# Patient Record
(demographics unavailable — no encounter records)

---
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
## 2024-10-11 NOTE — RESULTS/DATA
[TextEntry] : Chest CT imaging as above.  PSG from 1/31/18 revealed moderate ALTHEA with an AHI of 22 The patient's overall compliance was 93% with a >4hr compliance of 70% on autoCPAP with a median and max pressure of 5.7 and 10.9 cm of water, respectively with a residual AHI of 1 which was normal. The patient's current overall compliance is 87% with a >4hr compliance of 27% on autoCPAP with a median and max pressure of 5.3 and 8.8 cm of water, respectively with a residual AHI of 2.5 which is normal. Pt no longer compliant.

## 2024-10-11 NOTE — DISCUSSION/SUMMARY
[FreeTextEntry1] : #1. Previously, there was mild restriction on spirometry though lung volumes were essentially normal; PFT findings were likely weight related though rib fx could be contributing as well. Repeat lung function testing with weight loss. #2. Diet and exercise for weight loss #3. The patient does not appear to require BD therapy at this time #4. SOBOE is likely related to weight or deconditioning given PFT findings #5. Continue autoCPAP to treat moderate ALTHEA; changed to 5-8 cm of water per patient's request; encouraged improved > 4 hr compliance of at least 70% #6. Repeat chest CT in 7/2020 was clear. Repeat imaging as needed. #7. F/u in 4-6 weeks with compliance. #8. Replace equipment as needed; ordered 10/11/24.  The patient expressed understanding and agreement with the above recommendations/plan and accepts responsibility to be compliant with recommended testing, therapies, and f/u visits. All relevant questions and concerns were addressed.

## 2024-10-11 NOTE — PROCEDURE
[FreeTextEntry1] : PFTs performed 1/2018 revealed a mildly reduced FVC and FEV1 without an obstructive pattern. Lung volumes are essentially normal and diffusion capacity is mildly reduced but corrects for alveolar volume.\par  PFTs from 7/9/20 revealed mild restriction on spirometry with normal lung volumes and diffusion capacity; overall is at baseline but with slight improvement in spirometry

## 2024-10-11 NOTE — CONSULT LETTER
[Dear  ___] : Dear  [unfilled], [Consult Letter:] : I had the pleasure of evaluating your patient, [unfilled]. [Please see my note below.] : Please see my note below. [Consult Closing:] : Thank you very much for allowing me to participate in the care of this patient.  If you have any questions, please do not hesitate to contact me. [Sincerely,] : Sincerely, [FreeTextEntry3] : Frank Canchola MD, FCCP, D. ABSM\par  Pulmonary and Sleep Medicine\par  [facility] [place]

## 2024-10-11 NOTE — PHYSICAL EXAM
[No Acute Distress] : no acute distress [Low Lying Soft Palate] : low lying soft palate [Elongated Uvula] : elongated uvula [Enlarged Base of the Tongue] : enlarged base of the tongue [IV] : Mallampati Class: IV [Normal Appearance] : normal appearance [Supple] : supple [Normal Rate/Rhythm] : normal rate/rhythm [Normal S1, S2] : normal s1, s2 [No Murmurs] : no murmurs [No Resp Distress] : no resp distress [No Acc Muscle Use] : no acc muscle use [Normal Rhythm and Effort] : normal rhythm and effort [Clear to Auscultation Bilaterally] : clear to auscultation bilaterally [No Abnormalities] : no abnormalities [Benign] : benign [Not Tender] : not tender [Soft] : soft [No Clubbing] : no clubbing [No Edema] : no edema [No Focal Deficits] : no focal deficits [Oriented x3] : oriented x3 [TextBox_11] : severe oropharyngeal crowding.

## 2024-10-11 NOTE — REVIEW OF SYSTEMS
[Fatigue] : fatigue [SOB on Exertion] : sob on exertion [Abdominal Pain] : abdominal pain [Nausea] : nausea [Depression] : depression [Obesity] : obesity [Negative] : Neurologic [TextBox_69] : Following with GI

## 2024-10-11 NOTE — END OF VISIT
[Time Spent: ___ minutes] : I have spent [unfilled] minutes of time on the encounter which excludes teaching and separately reported services. [TextEntry] : Discussed with pt at length regarding ALTHEA, morbidly obese, SOBOE, cough, restriction on spirometry, abnormal CT including resolution of effusions; reviewed w/u with pt as above.

## 2024-12-10 NOTE — HISTORY OF PRESENT ILLNESS
[de-identified] : This is a 66 yro pt  patient referred by Dr. Urban for eval of high calcium levels. PTH and Calcium levels obtained from pts portal on phone today:  labs 11/1/2024: calcium= 10.5 (H), albumin= 4.2, TSH w/ reflex to FT4= 1.61  wnl  labs 10/23/24: calcium= 11.7 (H), albumin= 4.2 7/8/24: 24hr urine calcium= 307 (H)  7/3/2024: iPTH= 100 H, Calcium= 11.1 H, vit D= 14 L Not taking calcium supplements. Taking Vitamin D once/week.  No head/neck imaging/biopsy done.  No h/o kidney stones, bone fractures, or osteoporosis.  Reports gallstones and bone pain/arthritis pain. Also has GI issues, alternates between constipation and diarrhea for the past year.  Pt reports feeling nauseous, weak and bloated today.   Has seen multiple GI doctors. Quit smoking in 1997. Smoked for about 20 years.

## 2024-12-10 NOTE — PLAN
[TextEntry] : - Hyperparathyroidism with elevated urinary calcium and serum calcium over 11.5 at some point. - US in the office today showed possible 1.1 cm R inferior parathyroid. - Discussed findings with patient and will request a 4D CT. - Return with results.

## 2024-12-10 NOTE — CONSULT LETTER
[Dear  ___] : Dear  [unfilled], [Consult Letter:] : I had the pleasure of evaluating your patient, [unfilled]. [Please see my note below.] : Please see my note below. [Consult Closing:] : Thank you very much for allowing me to participate in the care of this patient.  If you have any questions, please do not hesitate to contact me. [Sincerely,] : Sincerely, [FreeTextEntry2] : Dr Brittanie Urban [FreeTextEntry3] :  Caden Mann MD, FACS  Otolaryngology-Head and Neck Surgery [place] [facility] [employer] [facility]

## 2025-01-14 NOTE — HISTORY OF PRESENT ILLNESS
[de-identified] : This is a 66 yro pt  patient referred by Dr. Urban for eval of high calcium levels. PTH and Calcium levels obtained from pts portal on phone:  labs 11/1/2024: calcium= 10.5 (H), albumin= 4.2, TSH w/ reflex to FT4= 1.61  wnl  labs 10/23/24: calcium= 11.7 (H), albumin= 4.2 7/8/24: 24hr urine calcium= 307 (H)  7/3/2024: iPTH= 100 H, Calcium= 11.1 H, vit D= 14 L Not taking calcium supplements. Taking Vitamin D once/week.  No h/o kidney stones, bone fractures, or osteoporosis.  Reports continued bone pain/arthritis pain. Also has GI issues, alternates between constipation and diarrhea for the past year.  Pt reports feeling nauseous, weak and bloated.   Has seen multiple GI doctors. Quit smoking in 1997. Smoked for about 20 years.   CT 4D Parathyroid [facility] IC 12/27/2024:  IMPRESSION: Enhancing nodule just below the anterior margin of the right thyroid lobe strongly suspicious for a parathyroid adenoma. Enhancing nodule within or just below the thyroid isthmus/left lobe to the left of midline likely of thyroid origin however a parathyroid adenoma is not excluded.

## 2025-01-14 NOTE — CONSULT LETTER
[Dear  ___] : Dear  [unfilled], [Consult Letter:] : I had the pleasure of evaluating your patient, [unfilled]. [Please see my note below.] : Please see my note below. [Consult Closing:] : Thank you very much for allowing me to participate in the care of this patient.  If you have any questions, please do not hesitate to contact me. [Sincerely,] : Sincerely, [FreeTextEntry2] : Dr Brittanie Urban [FreeTextEntry3] :  Caden Mann MD, FACS  Otolaryngology-Head and Neck Surgery [place] [facility] [employer] at [facility]

## 2025-01-14 NOTE — PLAN
[TextEntry] : - Hyperparathyroidism with elevated urinary calcium and serum calcium over 11.5 at some point. - US in the office today showed possible 1.1 cm R inferior parathyroid. - 4D CT showing R inferior parathyroid and less likely a L inferior parathyroid. I recommended surgery today. Risks and complications of the procedure discussed today.

## 2025-04-01 NOTE — ADDENDUM
[FreeTextEntry1] : April 1, 2025  The patient underwent a transthoracic echocardiogram on March 18, 2025.  The study demonstrated preserved left ventricular size and systolic function with normal ejection fraction of 60 to 65%.  There was normal left atrial and right atrial dimensions.  There was trace mitral insufficiency and tricuspid insufficiency and aortic insufficiency.  Mild fibrocalcific aortic sclerosis without stenosis; normal aortic dimensions.  No pericardial effusion;  On March 27, 2025 the patient underwent a regular exercise stress test in the office.  Although he demonstrated low exercise tolerance-before developing fatigue and some shortness of breath, there was no associated chest pain.  The EKGs remained negative for any significant ST abnormality or ischemia on a modified workload.;  Blood pressure response was normal and there was moderate premature ventricular beats noted;  Based on the above findings, there is no absolute cardiac contraindication for the proposed parathyroid surgery;  Recommend perioperative cardiac monitoring until stable.   If we can be of additional assistance please not hesitate to call;

## 2025-06-03 NOTE — HISTORY OF PRESENT ILLNESS
[de-identified] : This is a 67 yro pt  patient referred by Dr. Urban for eval of high calcium levels. 4D CT showing R inferior parathyroid and less likely a L inferior parathyroid.  Here today for follow-up s/p Resection of right inferior parathyroid on 4/28/25  Today pt reports mild pain with swallowing liquids, getting better. Feels small hard lump at the incision site.  No fever, chills, neck pain, dysphonia, or dyspnea.   PATHOLOGY 4/28/25 Specimen(s) Submitted 1- Right inferior parathyroid 2- Rule out left inferior parathyroid  Final Diagnosis  1. Parathyroid, right inferior, parathyroidectomy - Hypercellular parathyroid tissue with features consistent with  parathyroid adenoma, clinical correlation is recommended  2. 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

## 2025-06-03 NOTE — CONSULT LETTER
[Dear  ___] : Dear  [unfilled], [Consult Letter:] : I had the pleasure of evaluating your patient, [unfilled]. [Please see my note below.] : Please see my note below. [Consult Closing:] : Thank you very much for allowing me to participate in the care of this patient.  If you have any questions, please do not hesitate to contact me. [Sincerely,] : Sincerely, [FreeTextEntry2] : Dr Brittanie Urban [FreeTextEntry3] :  Caden Mann MD, FACS  Otolaryngology-Head and Neck Surgery [place] [facility] [employer] [facility]

## 2025-06-03 NOTE — HISTORY OF PRESENT ILLNESS
[de-identified] : This is a 67 yro pt  patient referred by Dr. Urban for eval of high calcium levels. 4D CT showing R inferior parathyroid and less likely a L inferior parathyroid.  Here today for follow-up s/p Resection of right inferior parathyroid on 4/28/25  Today pt reports mild pain with swallowing liquids, getting better. Feels small hard lump at the incision site.  No fever, chills, neck pain, dysphonia, or dyspnea.   PATHOLOGY 4/28/25 Specimen(s) Submitted 1- Right inferior parathyroid 2- Rule out left inferior parathyroid  Final Diagnosis  1. Parathyroid, right inferior, parathyroidectomy - Hypercellular parathyroid tissue with features consistent with  parathyroid adenoma, clinical correlation is recommended  2. 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

## 2025-06-03 NOTE — CONSULT LETTER
[Dear  ___] : Dear  [unfilled], [Consult Letter:] : I had the pleasure of evaluating your patient, [unfilled]. [Please see my note below.] : Please see my note below. [Consult Closing:] : Thank you very much for allowing me to participate in the care of this patient.  If you have any questions, please do not hesitate to contact me. [Sincerely,] : Sincerely, [FreeTextEntry2] : Dr Brittanie Urban [FreeTextEntry3] :  Caden Mann MD, FACS  Otolaryngology-Head and Neck Surgery [place] [facility] [employer] [facility]

## 2025-06-03 NOTE — PLAN
[TextEntry] : - Hyperparathyroidism with elevated urinary calcium and serum calcium over 11.5 at some point. - US in the office today showed possible 1.1 cm R inferior parathyroid. - 4D CT showing R inferior parathyroid and less likely a L inferior parathyroid.  - S/P resection of R inferior parathyroid on 4/28/25. PTH dropped from 160 to 55. - Healing well. Will repeat labs in 3 weeks. - Return in 6 months.

## 2025-06-03 NOTE — REASON FOR VISIT
[Post-Operative Visit] : a post-operative visit [FreeTextEntry2] : s/p Resection of right inferior parathyroid on 4/28/25